# Patient Record
Sex: FEMALE | Race: WHITE | NOT HISPANIC OR LATINO
[De-identification: names, ages, dates, MRNs, and addresses within clinical notes are randomized per-mention and may not be internally consistent; named-entity substitution may affect disease eponyms.]

---

## 2020-11-28 DIAGNOSIS — N39.0 URINARY TRACT INFECTION, SITE NOT SPECIFIED: ICD-10-CM

## 2020-11-28 DIAGNOSIS — A49.9 URINARY TRACT INFECTION, SITE NOT SPECIFIED: ICD-10-CM

## 2020-11-28 PROBLEM — Z00.00 ENCOUNTER FOR PREVENTIVE HEALTH EXAMINATION: Status: ACTIVE | Noted: 2020-11-28

## 2020-11-30 ENCOUNTER — APPOINTMENT (OUTPATIENT)
Dept: UROGYNECOLOGY | Facility: CLINIC | Age: 45
End: 2020-11-30
Payer: COMMERCIAL

## 2020-11-30 VITALS
HEIGHT: 67 IN | TEMPERATURE: 97.1 F | DIASTOLIC BLOOD PRESSURE: 90 MMHG | SYSTOLIC BLOOD PRESSURE: 130 MMHG | WEIGHT: 165 LBS | BODY MASS INDEX: 25.9 KG/M2

## 2020-11-30 DIAGNOSIS — N85.2 HYPERTROPHY OF UTERUS: ICD-10-CM

## 2020-11-30 PROCEDURE — 99204 OFFICE O/P NEW MOD 45 MIN: CPT

## 2020-11-30 PROCEDURE — 99072 ADDL SUPL MATRL&STAF TM PHE: CPT

## 2020-11-30 PROCEDURE — 51798 US URINE CAPACITY MEASURE: CPT

## 2020-12-03 LAB — BACTERIA UR CULT: ABNORMAL

## 2020-12-14 PROBLEM — N85.2 ENLARGED UTERUS: Status: ACTIVE | Noted: 2020-12-14

## 2020-12-14 NOTE — HISTORY OF PRESENT ILLNESS
[FreeTextEntry1] : The pt is a 44 y/o P3 with worsening OAB and mixed UI  for 11 yrs, with a bothersome level of 10/10.\par LMP 11/20/2020  monthly for 5 days, heavy for 2 days and uses 1 box of pads.  She feels that her menorrhagia worsened after her myomectomy\par She wears 7-10 PPD for UI.\par She leaks urine with coughing, laughing, coitus and urgency .\par She feels complete emptying of her bladder.\par She voids every 1-2 hrs with a medium to large volume and has nocturia 2-3x with a medium to large volume\par Her liquid intake consists of 10 glasses water, 3 cups of coffee/day.\par She has a BM q OD .\par She denies gross hematuria, recurrent UTIs, hx of nephrolithiaisis or vaginal bulge.\par Her last PAP was 2019 , and she denies a hx of abnormal PAP.\par She is currently not using contraception .\par The last time she had intercourse was 3 days ago . \par Her abdominal surgical hx is significant for abdominoplasty, myomectomy via laparotomy . She reports no surgical complications: \par She is an  for Dr Jasso, plastic surgeon\par \par

## 2020-12-14 NOTE — ASSESSMENT
[FreeTextEntry1] : Today's findings were discussed with the pt and written pamphlets were provided for vaginal prolapse and urinary incontinence.  After discussing various options of treatment, she is considering surgery.  If surgery, I would recommend A&P repair and TVT.   She will be scheduled for urodynamics.  Also, her urine was sent for culture today to rule out an infectious cause of her symptoms..\par For menorrhagia and enlarged uterus, EMBx and pelvic U/S recommended\par \par \par

## 2020-12-14 NOTE — PHYSICAL EXAM
[Anxiety] : patient is not anxious [Cough] : no cough [Dyspnea] : no dyspnea [Murmurs] : no murmurs were heard [Varicose Veins] : no varicose veins observed [Tracheal Deviation] : no tracheal deviation observed [Mass] : no ~M [unfilled] neck mass was observed [Mass (___ Cm)] : no ~M [unfilled] abdominal mass was palpated [Tenderness] : ~T no ~M abdominal tenderness observed [Distended] : not distended [H/Smegaly] : no hepatosplenomegaly [Hernia] : no hernia observed [Scar] : no scars [de-identified] : good apical support

## 2020-12-15 ENCOUNTER — APPOINTMENT (OUTPATIENT)
Dept: UROGYNECOLOGY | Facility: CLINIC | Age: 45
End: 2020-12-15
Payer: COMMERCIAL

## 2020-12-15 PROCEDURE — 51797 INTRAABDOMINAL PRESSURE TEST: CPT

## 2020-12-15 PROCEDURE — 51784 ANAL/URINARY MUSCLE STUDY: CPT

## 2020-12-15 PROCEDURE — 51798 US URINE CAPACITY MEASURE: CPT

## 2020-12-15 PROCEDURE — 51741 ELECTRO-UROFLOWMETRY FIRST: CPT

## 2020-12-15 PROCEDURE — 51729 CYSTOMETROGRAM W/VP&UP: CPT

## 2020-12-15 PROCEDURE — 99072 ADDL SUPL MATRL&STAF TM PHE: CPT

## 2021-01-20 ENCOUNTER — APPOINTMENT (OUTPATIENT)
Dept: UROGYNECOLOGY | Facility: CLINIC | Age: 46
End: 2021-01-20
Payer: COMMERCIAL

## 2021-01-20 DIAGNOSIS — N36.42 INTRINSIC SPHINCTER DEFICIENCY (ISD): ICD-10-CM

## 2021-01-20 PROCEDURE — 99213 OFFICE O/P EST LOW 20 MIN: CPT

## 2021-01-20 NOTE — HISTORY OF PRESENT ILLNESS
[FreeTextEntry1] : The pt is here for UDS f/u.\par \par +JUDITH, LPP 34 cm H2O, -DO, +complete emptying\par \par She also has stage 2 vaginal prolapse

## 2021-01-20 NOTE — ASSESSMENT
[FreeTextEntry1] : She will be scheduled for TVT and A&P repair on 3/9 as her surgeon/boss will be out of town.\par \par I discussed various treatment options including pelvic floor physical therapy, pessary and surgery.  She declined conservative therapy and opted for surgery.  We discussed various anti-incontinence procedures including Posada urethropexy, autologous fascial sling and midurethral sling with mesh.  Risks and benefits and long term efficacy of each procedure were discussed and written pamphlets were provided.  She opted for TVT as this is considered to be the gold standard for treatment of stress urinary incontinence with long term efficacy of 85-90%.  Extensive counseling on the Position Statement on Mesh Midurethral Slings for JUDITH by American Urogynecologic Society(AUGS), American Urologic Association (AUA), Society for Gynecologic Surgeons (SGS), American College of Obstetrics and Gynecology (ACOG), National Association of Continence (NAFC) took place.  The patient understood that synthetic mid-urethral sling is the most common surgery performed for treatment of  JUDITH and extensive data exist to support the use of synthetic polypropylene mesh for treatment of JUDITH with minimal morbidity compared to alternative surgeries.  Advantages include shorter operative time, reduced surgical pain, reduced hospitalization, and reduced voiding dyfunction.  Mesh-related conplication can occur following synthetic mesh placement, but the rate of these complications is acceptably low per ALL the above mentioned societies.  Furthermore, it is important to recognize that many of the sling related complications are NOT unique to mesh surgeries and are known to occur with non-mesh related procedures as well. Multiple RCT and case series attest to the efficacy at 5-10 yrs that is equivalent or superior to other techniques.  We discussed the risks of planned surgery; which include, and not limited to, bleeding, infection, damage to surrounding organs, persistent and recurrent JUDITH, mesh exposure (2-5%) often excised in the office, She was also informed of de-agus urge UI and that this is a treatment of JUDITH and NOT urgency UI.  The patient expressed understanding of the nature of the surgery.\par \par

## 2021-01-27 ENCOUNTER — APPOINTMENT (OUTPATIENT)
Dept: HEART AND VASCULAR | Facility: CLINIC | Age: 46
End: 2021-01-27
Payer: COMMERCIAL

## 2021-01-27 VITALS
BODY MASS INDEX: 26.84 KG/M2 | WEIGHT: 171 LBS | TEMPERATURE: 98.4 F | OXYGEN SATURATION: 99 % | HEIGHT: 67 IN | HEART RATE: 99 BPM | SYSTOLIC BLOOD PRESSURE: 120 MMHG | DIASTOLIC BLOOD PRESSURE: 70 MMHG

## 2021-01-27 DIAGNOSIS — Z01.818 ENCOUNTER FOR OTHER PREPROCEDURAL EXAMINATION: ICD-10-CM

## 2021-01-27 DIAGNOSIS — Z86.2 PERSONAL HISTORY OF DISEASES OF THE BLOOD AND BLOOD-FORMING ORGANS AND CERTAIN DISORDERS INVOLVING THE IMMUNE MECHANISM: ICD-10-CM

## 2021-01-27 DIAGNOSIS — Z78.9 OTHER SPECIFIED HEALTH STATUS: ICD-10-CM

## 2021-01-27 DIAGNOSIS — Z82.49 FAMILY HISTORY OF ISCHEMIC HEART DISEASE AND OTHER DISEASES OF THE CIRCULATORY SYSTEM: ICD-10-CM

## 2021-01-27 PROCEDURE — 93000 ELECTROCARDIOGRAM COMPLETE: CPT | Mod: NC

## 2021-01-27 PROCEDURE — 99204 OFFICE O/P NEW MOD 45 MIN: CPT

## 2021-01-27 PROCEDURE — 36415 COLL VENOUS BLD VENIPUNCTURE: CPT

## 2021-01-27 PROCEDURE — 99072 ADDL SUPL MATRL&STAF TM PHE: CPT

## 2021-01-27 RX ORDER — AMPICILLIN 500 MG/1
500 CAPSULE ORAL 4 TIMES DAILY
Qty: 12 | Refills: 0 | Status: DISCONTINUED | COMMUNITY
Start: 2021-01-26 | End: 2021-01-27

## 2021-01-27 RX ORDER — SULFAMETHOXAZOLE AND TRIMETHOPRIM 800; 160 MG/1; MG/1
800-160 TABLET ORAL TWICE DAILY
Qty: 10 | Refills: 0 | Status: DISCONTINUED | COMMUNITY
Start: 2020-12-05 | End: 2021-01-27

## 2021-01-28 LAB
ALBUMIN SERPL ELPH-MCNC: 4.6 G/DL
ALP BLD-CCNC: 64 U/L
ALT SERPL-CCNC: 13 U/L
ANION GAP SERPL CALC-SCNC: 12 MMOL/L
APTT BLD: 27.4 SEC
AST SERPL-CCNC: 15 U/L
BASOPHILS # BLD AUTO: 0.05 K/UL
BASOPHILS NFR BLD AUTO: 0.8 %
BILIRUB SERPL-MCNC: 0.5 MG/DL
BUN SERPL-MCNC: 19 MG/DL
CALCIUM SERPL-MCNC: 9.6 MG/DL
CHLORIDE SERPL-SCNC: 101 MMOL/L
CO2 SERPL-SCNC: 24 MMOL/L
CREAT SERPL-MCNC: 0.93 MG/DL
EOSINOPHIL # BLD AUTO: 0.16 K/UL
EOSINOPHIL NFR BLD AUTO: 2.6 %
GLUCOSE SERPL-MCNC: 89 MG/DL
HCG SERPL-MCNC: <1 MIU/ML
HCT VFR BLD CALC: 35.8 %
HGB BLD-MCNC: 10.1 G/DL
IMM GRANULOCYTES NFR BLD AUTO: 0.3 %
INR PPP: 0.98 RATIO
LYMPHOCYTES # BLD AUTO: 1.68 K/UL
LYMPHOCYTES NFR BLD AUTO: 27.8 %
MAN DIFF?: NORMAL
MCHC RBC-ENTMCNC: 22.7 PG
MCHC RBC-ENTMCNC: 28.2 GM/DL
MCV RBC AUTO: 80.4 FL
MONOCYTES # BLD AUTO: 0.46 K/UL
MONOCYTES NFR BLD AUTO: 7.6 %
NEUTROPHILS # BLD AUTO: 3.67 K/UL
NEUTROPHILS NFR BLD AUTO: 60.9 %
PLATELET # BLD AUTO: 206 K/UL
POTASSIUM SERPL-SCNC: 4.1 MMOL/L
PROT SERPL-MCNC: 7.5 G/DL
PT BLD: 11.6 SEC
RBC # BLD: 4.45 M/UL
RBC # FLD: 15.6 %
SODIUM SERPL-SCNC: 138 MMOL/L
WBC # FLD AUTO: 6.04 K/UL

## 2021-02-02 PROBLEM — N39.46 MIXED STRESS AND URGE URINARY INCONTINENCE: Status: ACTIVE | Noted: 2020-11-30

## 2021-02-02 PROBLEM — N81.4 UTEROVAGINAL PROLAPSE: Status: ACTIVE | Noted: 2020-11-30

## 2021-02-03 ENCOUNTER — APPOINTMENT (OUTPATIENT)
Dept: UROGYNECOLOGY | Facility: CLINIC | Age: 46
End: 2021-02-03
Payer: COMMERCIAL

## 2021-02-03 DIAGNOSIS — N39.46 MIXED INCONTINENCE: ICD-10-CM

## 2021-02-03 DIAGNOSIS — N81.4 UTEROVAGINAL PROLAPSE, UNSPECIFIED: ICD-10-CM

## 2021-02-03 PROCEDURE — 99072 ADDL SUPL MATRL&STAF TM PHE: CPT

## 2021-02-03 PROCEDURE — 99213 OFFICE O/P EST LOW 20 MIN: CPT

## 2021-02-03 NOTE — ASSESSMENT
[FreeTextEntry1] : She will proceed with TVT, and A&P repair.\par She was cleared by her PCP\par \par I discussed various treatment options including pelvic floor physical therapy, pessary and surgery.  She declined conservative therapy and opted for surgery.  We discussed various anti-incontinence procedures including Posada urethropexy, autologous fascial sling and midurethral sling with mesh.  Risks and benefits and long term efficacy of each procedure were discussed and written pamphlets were provided.  She opted for TVT as this is considered to be the gold standard for treatment of stress urinary incontinence with long term efficacy of 85-90%.  Extensive counseling on the Position Statement on Mesh Midurethral Slings for JUDITH by American Urogynecologic Society(AUGS), American Urologic Association (AUA), Society for Gynecologic Surgeons (SGS), American College of Obstetrics and Gynecology (ACOG), National Association of Continence (NAFC) took place.  The patient understood that synthetic mid-urethral sling is the most common surgery performed for treatment of  JUDITH and extensive data exist to support the use of synthetic polypropylene mesh for treatment of JUDITH with minimal morbidity compared to alternative surgeries.  Advantages include shorter operative time, reduced surgical pain, reduced hospitalization, and reduced voiding dyfunction.  Mesh-related conplication can occur following synthetic mesh placement, but the rate of these complications is acceptably low per ALL the above mentioned societies.  Furthermore, it is important to recognize that many of the sling related complications are NOT unique to mesh surgeries and are known to occur with non-mesh related procedures as well. Multiple RCT and case series attest to the efficacy at 5-10 yrs that is equivalent or superior to other techniques.  We discussed the risks of planned surgery; which include, and not limited to, bleeding, infection, damage to surrounding organs, persistent and recurrent JUDITH, mesh exposure (2-5%) often excised in the office, She was also informed of de-agus urge UI and that this is a treatment of JUDITH and NOT urgency UI.  The patient expressed understanding of the nature of the surgery and consent was obtained.\par \par

## 2021-02-03 NOTE — LETTER BODY
[Dear  ___] : Dear  [unfilled], [I had the pleasure of evaluating your patient, [unfilled]. Thank you for referring Ms. [unfilled] for consultation for ___] : I had the pleasure of evaluating your patient, [unfilled]. Thank you for referring Ms. [unfilled] for consultation for [unfilled]. [Attached please find my note.] : Attached please find my note. [Thank you very much for allowing me to participate in the care of this patient. If you have any questions, please do not hesitate to contact me] : Thank you very much for allowing me to participate in the care of this patient. If you have any questions, please do not hesitate to contact me. [FreeTextEntry1] : JUDITH and vaginal prolapse

## 2021-02-06 RX ORDER — CIPROFLOXACIN HYDROCHLORIDE 500 MG/1
500 TABLET, FILM COATED ORAL
Qty: 6 | Refills: 0 | Status: ACTIVE | COMMUNITY
Start: 2021-02-06 | End: 1900-01-01

## 2021-02-08 ENCOUNTER — TRANSCRIPTION ENCOUNTER (OUTPATIENT)
Age: 46
End: 2021-02-08

## 2021-02-08 NOTE — DISCUSSION/SUMMARY
[FreeTextEntry1] : stable exam, medically optimized planned procedure\par \par Addendum- labs reviewed, stable to proceed, urine culture has per GYN

## 2021-02-08 NOTE — HISTORY OF PRESENT ILLNESS
[FreeTextEntry1] : for gyn surgery\par good functional capacity\par no h/o bleeding tendencies\par no h/o anesthesia reaction

## 2021-02-09 ENCOUNTER — OUTPATIENT (OUTPATIENT)
Dept: OUTPATIENT SERVICES | Facility: HOSPITAL | Age: 46
LOS: 1 days | Discharge: HOME | End: 2021-02-09

## 2021-02-09 ENCOUNTER — APPOINTMENT (OUTPATIENT)
Dept: UROGYNECOLOGY | Facility: HOSPITAL | Age: 46
End: 2021-02-09
Payer: COMMERCIAL

## 2021-02-09 DIAGNOSIS — G89.18 OTHER ACUTE POSTPROCEDURAL PAIN: ICD-10-CM

## 2021-02-09 PROCEDURE — 57260 CMBN ANT PST COLPRHY: CPT

## 2021-02-09 PROCEDURE — 57288 REPAIR BLADDER DEFECT: CPT

## 2021-02-09 RX ORDER — OXYCODONE AND ACETAMINOPHEN 5; 325 MG/1; MG/1
5-325 TABLET ORAL EVERY 6 HOURS
Qty: 5 | Refills: 0 | Status: ACTIVE | COMMUNITY
Start: 2021-02-09 | End: 1900-01-01

## 2021-02-09 RX ORDER — NITROFURANTOIN (MONOHYDRATE/MACROCRYSTALS) 25; 75 MG/1; MG/1
100 CAPSULE ORAL
Qty: 28 | Refills: 0 | Status: ACTIVE | COMMUNITY
Start: 2021-02-09 | End: 1900-01-01

## 2021-02-16 ENCOUNTER — APPOINTMENT (OUTPATIENT)
Dept: UROGYNECOLOGY | Facility: CLINIC | Age: 46
End: 2021-02-16
Payer: COMMERCIAL

## 2021-02-16 PROCEDURE — 99024 POSTOP FOLLOW-UP VISIT: CPT

## 2021-02-16 NOTE — OBJECTIVE
[Voiding Trial] : Voiding trial was performed [Post Void Residual ____ ml] : Post Void Residual was [unfilled] ml [Soft and Nontender] : soft and nontender [Clean, Dry, Intact] : Clean, Dry, Intact

## 2021-02-16 NOTE — DISCUSSION/SUMMARY
[Post-Op instructions given. Pt/family verbalizes understanding] : post-operative instructions were provided to the patient/family who verbalize understanding [Risks/Benefits discussed. Pt/family verbalizes understanding] : risks and benefits of the procedure were discussed with the patient/family who verbalize understanding

## 2021-02-16 NOTE — ASSESSMENT
[FreeTextEntry1] : Failed trial of void today.\par Patient taught to self catheterize. Successful teach back demonstrated.\par Patient to cath every morning and evening and PRN. \par RTO in 3 weeks or sooner as needed\par Urine sent for culture.

## 2021-02-16 NOTE — SUBJECTIVE
[FreeTextEntry1] : Taking ibuprofen and Tylenol for pain. Ambulating well. Reports regular bowel movements. Reports good appetite.

## 2021-02-17 ENCOUNTER — APPOINTMENT (OUTPATIENT)
Dept: UROGYNECOLOGY | Facility: CLINIC | Age: 46
End: 2021-02-17

## 2021-02-17 RX ORDER — CIPROFLOXACIN HYDROCHLORIDE 500 MG/1
500 TABLET, FILM COATED ORAL TWICE DAILY
Qty: 10 | Refills: 0 | Status: ACTIVE | COMMUNITY
Start: 2021-02-17 | End: 1900-01-01

## 2021-02-18 LAB — BACTERIA UR CULT: NORMAL

## 2021-02-25 ENCOUNTER — APPOINTMENT (OUTPATIENT)
Dept: UROGYNECOLOGY | Facility: CLINIC | Age: 46
End: 2021-02-25

## 2021-03-01 ENCOUNTER — APPOINTMENT (OUTPATIENT)
Dept: UROGYNECOLOGY | Facility: CLINIC | Age: 46
End: 2021-03-01

## 2021-03-30 PROBLEM — Z98.890 POST-OPERATIVE STATE: Status: ACTIVE | Noted: 2021-02-15

## 2021-03-31 ENCOUNTER — APPOINTMENT (OUTPATIENT)
Dept: UROGYNECOLOGY | Facility: CLINIC | Age: 46
End: 2021-03-31
Payer: COMMERCIAL

## 2021-03-31 DIAGNOSIS — Z98.890 OTHER SPECIFIED POSTPROCEDURAL STATES: ICD-10-CM

## 2021-03-31 PROCEDURE — 99024 POSTOP FOLLOW-UP VISIT: CPT

## 2021-03-31 NOTE — SUBJECTIVE
[FreeTextEntry1] : The pt is doing well.  Denies JUDITH.  Sometimes feels incomplete emptying.  Her main liquid intake is ice tea

## 2021-03-31 NOTE — OBJECTIVE
[Post Void Residual ____ ml] : Post Void Residual was [unfilled] ml [Soft and Nontender] : soft and nontender [Clean, Dry, Intact] : Clean, Dry, Intact [Good Support] : Good support [Healing well] : healing well [No Masses or Tenderness] : no masses or tenderness [FreeTextEntry3] : Hx of menorrhagia.  Sutures still visible

## 2021-05-05 ENCOUNTER — APPOINTMENT (OUTPATIENT)
Dept: UROGYNECOLOGY | Facility: CLINIC | Age: 46
End: 2021-05-05

## 2024-12-01 ENCOUNTER — EMERGENCY (EMERGENCY)
Facility: HOSPITAL | Age: 49
LOS: 1 days | Discharge: HOME | End: 2024-12-01
Attending: STUDENT IN AN ORGANIZED HEALTH CARE EDUCATION/TRAINING PROGRAM | Admitting: STUDENT IN AN ORGANIZED HEALTH CARE EDUCATION/TRAINING PROGRAM
Payer: COMMERCIAL

## 2024-12-01 ENCOUNTER — NON-APPOINTMENT (OUTPATIENT)
Age: 49
End: 2024-12-01

## 2024-12-01 VITALS
TEMPERATURE: 98 F | HEART RATE: 81 BPM | SYSTOLIC BLOOD PRESSURE: 120 MMHG | OXYGEN SATURATION: 98 % | DIASTOLIC BLOOD PRESSURE: 75 MMHG | RESPIRATION RATE: 18 BRPM

## 2024-12-01 VITALS
RESPIRATION RATE: 77 BRPM | OXYGEN SATURATION: 99 % | SYSTOLIC BLOOD PRESSURE: 143 MMHG | HEART RATE: 80 BPM | TEMPERATURE: 99 F | DIASTOLIC BLOOD PRESSURE: 83 MMHG

## 2024-12-01 DIAGNOSIS — R07.89 OTHER CHEST PAIN: ICD-10-CM

## 2024-12-01 LAB
ANION GAP SERPL CALC-SCNC: 18 MMOL/L — HIGH (ref 5–17)
BASOPHILS # BLD AUTO: 0.04 K/UL — SIGNIFICANT CHANGE UP (ref 0–0.2)
BASOPHILS NFR BLD AUTO: 0.6 % — SIGNIFICANT CHANGE UP (ref 0–2)
BUN SERPL-MCNC: 11 MG/DL — SIGNIFICANT CHANGE UP (ref 7–23)
CALCIUM SERPL-MCNC: 9 MG/DL — SIGNIFICANT CHANGE UP (ref 8.4–10.5)
CHLORIDE SERPL-SCNC: 95 MMOL/L — LOW (ref 96–108)
CO2 SERPL-SCNC: 25 MMOL/L — SIGNIFICANT CHANGE UP (ref 22–31)
CREAT SERPL-MCNC: 0.76 MG/DL — SIGNIFICANT CHANGE UP (ref 0.5–1.3)
EGFR: 97 ML/MIN/1.73M2 — SIGNIFICANT CHANGE UP
EOSINOPHIL # BLD AUTO: 0.26 K/UL — SIGNIFICANT CHANGE UP (ref 0–0.5)
EOSINOPHIL NFR BLD AUTO: 4.1 % — SIGNIFICANT CHANGE UP (ref 0–6)
FLUAV AG NPH QL: SIGNIFICANT CHANGE UP
FLUBV AG NPH QL: SIGNIFICANT CHANGE UP
GLUCOSE SERPL-MCNC: 84 MG/DL — SIGNIFICANT CHANGE UP (ref 70–99)
HCT VFR BLD CALC: 40.3 % — SIGNIFICANT CHANGE UP (ref 34.5–45)
HGB BLD-MCNC: 12.4 G/DL — SIGNIFICANT CHANGE UP (ref 11.5–15.5)
IMM GRANULOCYTES NFR BLD AUTO: 0.3 % — SIGNIFICANT CHANGE UP (ref 0–0.9)
LYMPHOCYTES # BLD AUTO: 1.87 K/UL — SIGNIFICANT CHANGE UP (ref 1–3.3)
LYMPHOCYTES # BLD AUTO: 29.8 % — SIGNIFICANT CHANGE UP (ref 13–44)
MCHC RBC-ENTMCNC: 25.9 PG — LOW (ref 27–34)
MCHC RBC-ENTMCNC: 30.8 G/DL — LOW (ref 32–36)
MCV RBC AUTO: 84.1 FL — SIGNIFICANT CHANGE UP (ref 80–100)
MONOCYTES # BLD AUTO: 0.41 K/UL — SIGNIFICANT CHANGE UP (ref 0–0.9)
MONOCYTES NFR BLD AUTO: 6.5 % — SIGNIFICANT CHANGE UP (ref 2–14)
NEUTROPHILS # BLD AUTO: 3.67 K/UL — SIGNIFICANT CHANGE UP (ref 1.8–7.4)
NEUTROPHILS NFR BLD AUTO: 58.7 % — SIGNIFICANT CHANGE UP (ref 43–77)
NRBC # BLD: 0 /100 WBCS — SIGNIFICANT CHANGE UP (ref 0–0)
PLATELET # BLD AUTO: 192 K/UL — SIGNIFICANT CHANGE UP (ref 150–400)
POTASSIUM SERPL-MCNC: 5 MMOL/L — SIGNIFICANT CHANGE UP (ref 3.5–5.3)
POTASSIUM SERPL-SCNC: 5 MMOL/L — SIGNIFICANT CHANGE UP (ref 3.5–5.3)
RBC # BLD: 4.79 M/UL — SIGNIFICANT CHANGE UP (ref 3.8–5.2)
RBC # FLD: 15.4 % — HIGH (ref 10.3–14.5)
RSV RNA NPH QL NAA+NON-PROBE: SIGNIFICANT CHANGE UP
SARS-COV-2 RNA SPEC QL NAA+PROBE: SIGNIFICANT CHANGE UP
SODIUM SERPL-SCNC: 138 MMOL/L — SIGNIFICANT CHANGE UP (ref 135–145)
TROPONIN T, HIGH SENSITIVITY RESULT: <6 NG/L — SIGNIFICANT CHANGE UP (ref 0–51)
WBC # BLD: 6.27 K/UL — SIGNIFICANT CHANGE UP (ref 3.8–10.5)
WBC # FLD AUTO: 6.27 K/UL — SIGNIFICANT CHANGE UP (ref 3.8–10.5)

## 2024-12-01 PROCEDURE — 80048 BASIC METABOLIC PNL TOTAL CA: CPT

## 2024-12-01 PROCEDURE — 84484 ASSAY OF TROPONIN QUANT: CPT

## 2024-12-01 PROCEDURE — 71046 X-RAY EXAM CHEST 2 VIEWS: CPT

## 2024-12-01 PROCEDURE — 71046 X-RAY EXAM CHEST 2 VIEWS: CPT | Mod: 26

## 2024-12-01 PROCEDURE — 87637 SARSCOV2&INF A&B&RSV AMP PRB: CPT

## 2024-12-01 PROCEDURE — 99284 EMERGENCY DEPT VISIT MOD MDM: CPT

## 2024-12-01 PROCEDURE — 96374 THER/PROPH/DIAG INJ IV PUSH: CPT

## 2024-12-01 PROCEDURE — 85025 COMPLETE CBC W/AUTO DIFF WBC: CPT

## 2024-12-01 PROCEDURE — 36415 COLL VENOUS BLD VENIPUNCTURE: CPT

## 2024-12-01 PROCEDURE — 99284 EMERGENCY DEPT VISIT MOD MDM: CPT | Mod: 25

## 2024-12-01 RX ORDER — KETOROLAC TROMETHAMINE 30 MG/ML
15 INJECTION INTRAMUSCULAR; INTRAVENOUS ONCE
Refills: 0 | Status: DISCONTINUED | OUTPATIENT
Start: 2024-12-01 | End: 2024-12-01

## 2024-12-01 RX ADMIN — KETOROLAC TROMETHAMINE 15 MILLIGRAM(S): 30 INJECTION INTRAMUSCULAR; INTRAVENOUS at 21:24

## 2024-12-01 NOTE — ED PROVIDER NOTE - ATTENDING APP SHARED VISIT CONTRIBUTION OF CARE
i discussed the care of the pt directly with the ACP while the pt was in the ED. i have reviewed the ACP note and agree w/ the history, exam and plan of care other than as noted above.    atypical chest pain  EKG NSR non ischemic  labs incl trop negative  cxr no infiltrate no ptx    dc w/ outpt f/u

## 2024-12-01 NOTE — ED PROVIDER NOTE - PATIENT PORTAL LINK FT
You can access the FollowMyHealth Patient Portal offered by [facility] by registering at the following website: [email or web address]. By joining [facility]’s FollowMyHealth portal, you will also be able to view your health information using other applications (apps) compatible with our system.

## 2024-12-01 NOTE — ED PROVIDER NOTE - IV ALTEPLASE INCLUSION HIDDEN
show
General: no sweating; +fever; chills; anorexia; weight loss: malaise  Skin/Breast: no rash; no itching; no dryness	  Ophthalmologic: no diplopia; no photophobia; no lacrimation L; no lacrimation R; No eye pain, no visual changes	  ENMT: no hearing difficulty; no ear pain; no tinnitus; no vertigo; no sinus symptoms: +throat pain  oral pain with lesions.  Respiratory and Thorax: no wheezing; no dyspnea; no cough; no hemoptysis, no sputum production, no stridor	  Cardiovascular: +chest pain; no palpitations; +dyspnea on exertion; no orthopnea, no pedal edema  Gastrointestinal: no nausea; no vomiting; no melena; no hematochezia; no jaundice; no diarrhea, no abdominal pain.   Genitourinary: no hematuria; no renal colic; no flank pain L; no flank pain R; no urine discoloration; no dysuria  Musculoskeletal: no arthralgia; no arthritis; no joint swelling; no myalgia  Neurological: no weakness; no headache; no loss of consciousness; no confusion  Psychiatric: no suicidal ideation; no depression; no anxiety; no insomnia  Hematology/Lymphatics: no gum bleeding; no nose bleeding; no skin lumps  Endocrine: No heat/cold intolerance, no polydipsia, or polyuria.

## 2024-12-01 NOTE — ED ADULT NURSE NOTE - OBJECTIVE STATEMENT
48 y/o female pmhx anemia presents to the ED c/o CP midsternal & flu-like symptoms: runny nose, cough, congestion with multiple people at home sick with similar symptoms. Pt went to  and was sent here for abnormal EKG. Denies SOB, HA, F/C, N/V/D, weakness, dizziness, and any other complaints at this time. On exam A&Ox4, RA, ambulatory, NAD. Speaking in clear coherent sentences, respirations are spontaneous and unlabored. No obvious signs of injury, COLLINS.

## 2024-12-01 NOTE — ED PROVIDER NOTE - CLINICAL SUMMARY MEDICAL DECISION MAKING FREE TEXT BOX
pt c/o chest "spasms" x 1 wk, non exertional, no associated sob/palpitations/dizziness, no known cad (just had a full cards w/u - states that wanted it done just to know), ekg nsr, no pe risk factors, perc neg, labs wnl, cxr clear, viral swab sent - no results upon dc but won't change tx plan, ? msk vs pleuritic, do not suspect acs or pe or pericarditis or dissection. pt understands and agrees w/plan, strict return precautions given

## 2024-12-01 NOTE — ED PROVIDER NOTE - OBJECTIVE STATEMENT
The pt is a 50 y/o F, who presents to ED c/o chest "spasms" x wk. Pt states that spasms come and go, no aggravating or alleviating factors, has not taken any pain meds, currently pain free, reports uri symptoms earlier but not resolved, incidentally had seen cards few wks ago (wanted to get an eval) and was wnl. Denies sob, palpitations, dizziness, syncope, n/v/d, abd pain, fever, cough, congestion.

## 2024-12-01 NOTE — ED ADULT TRIAGE NOTE - CHIEF COMPLAINT QUOTE
Pt reports 10 days of intermittent chest discomfort, worsening today. Pt reports she was sent from  d/t abnormal ekg.